# Patient Record
Sex: MALE | Race: WHITE
[De-identification: names, ages, dates, MRNs, and addresses within clinical notes are randomized per-mention and may not be internally consistent; named-entity substitution may affect disease eponyms.]

---

## 2020-10-16 ENCOUNTER — HOSPITAL ENCOUNTER (EMERGENCY)
Dept: HOSPITAL 96 - M.ERS | Age: 70
Discharge: HOME | End: 2020-10-16
Payer: MEDICARE

## 2020-10-16 VITALS — WEIGHT: 167.99 LBS | BODY MASS INDEX: 26.37 KG/M2 | HEIGHT: 67 IN

## 2020-10-16 VITALS — DIASTOLIC BLOOD PRESSURE: 78 MMHG | SYSTOLIC BLOOD PRESSURE: 122 MMHG

## 2020-10-16 DIAGNOSIS — Z79.4: ICD-10-CM
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
## 2020-10-16 DIAGNOSIS — M25.561: Primary | ICD-10-CM

## 2020-10-16 DIAGNOSIS — Z79.899: ICD-10-CM

## 2020-10-16 DIAGNOSIS — E10.9: ICD-10-CM
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
## 2020-11-03 ENCOUNTER — HOSPITAL ENCOUNTER (OUTPATIENT)
Dept: HOSPITAL 96 - M.MRI | Age: 70
End: 2020-11-03
Attending: ORTHOPAEDIC SURGERY
Payer: MEDICARE

## 2020-11-03 DIAGNOSIS — M22.2X1: ICD-10-CM

## 2020-11-03 DIAGNOSIS — M25.461: ICD-10-CM

## 2020-11-03 DIAGNOSIS — S83.221A: Primary | ICD-10-CM

## 2020-11-10 ENCOUNTER — HOSPITAL ENCOUNTER (OUTPATIENT)
Dept: HOSPITAL 96 - M.LAB | Age: 70
End: 2020-11-10
Attending: ORTHOPAEDIC SURGERY
Payer: MEDICARE

## 2020-11-10 DIAGNOSIS — Z20.828: ICD-10-CM

## 2020-11-10 DIAGNOSIS — Z01.812: Primary | ICD-10-CM

## 2020-11-13 ENCOUNTER — HOSPITAL ENCOUNTER (OUTPATIENT)
Dept: HOSPITAL 96 - M.SUR | Age: 70
Discharge: HOME | End: 2020-11-13
Attending: ORTHOPAEDIC SURGERY
Payer: MEDICARE

## 2020-11-13 DIAGNOSIS — M23.221: ICD-10-CM

## 2020-11-13 DIAGNOSIS — Z79.4: ICD-10-CM
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
## 2020-11-13 DIAGNOSIS — M25.561: Primary | ICD-10-CM

## 2020-11-13 DIAGNOSIS — Z98.890: ICD-10-CM

## 2020-11-13 DIAGNOSIS — M84.361A: ICD-10-CM

## 2020-11-13 DIAGNOSIS — M84.351A: ICD-10-CM

## 2020-11-13 DIAGNOSIS — E10.9: ICD-10-CM
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
## 2020-11-13 DIAGNOSIS — Z79.899: ICD-10-CM

## 2020-11-13 LAB
ABSOLUTE BASOPHILS: 0.1 THOU/UL (ref 0–0.2)
ABSOLUTE EOSINOPHILS: 0.2 THOU/UL (ref 0–0.7)
ABSOLUTE MONOCYTES: 0.7 THOU/UL (ref 0–1.2)
ANION GAP SERPL CALC-SCNC: 8 MMOL/L (ref 7–16)
BASOPHILS NFR BLD AUTO: 0.8 %
BUN SERPL-MCNC: 29 MG/DL (ref 7–18)
CALCIUM SERPL-MCNC: 9 MG/DL (ref 8.5–10.1)
CHLORIDE SERPL-SCNC: 107 MMOL/L (ref 98–107)
CO2 SERPL-SCNC: 27 MMOL/L (ref 21–32)
CREAT SERPL-MCNC: 1.1 MG/DL (ref 0.6–1.3)
EOSINOPHIL NFR BLD: 3 %
ESR (SEDRATE): 2 MM/HR (ref 0–20)
GLUCOSE SERPL-MCNC: 77 MG/DL (ref 70–99)
GRANULOCYTES NFR BLD MANUAL: 52.7 %
HCT VFR BLD CALC: 46.2 % (ref 42–52)
HGB BLD-MCNC: 15.9 GM/DL (ref 14–18)
LYMPHOCYTES # BLD: 2.5 THOU/UL (ref 0.8–5.3)
LYMPHOCYTES NFR BLD AUTO: 34 %
MCH RBC QN AUTO: 29.8 PG (ref 26–34)
MCHC RBC AUTO-ENTMCNC: 34.3 G/DL (ref 28–37)
MCV RBC: 86.8 FL (ref 80–100)
MONOCYTES NFR BLD: 9.5 %
MPV: 8.2 FL. (ref 7.2–11.1)
NEUTROPHILS # BLD: 3.9 THOU/UL (ref 1.6–8.1)
NUCLEATED RBCS: 0 /100WBC
PLATELET COUNT*: 159 THOU/UL (ref 150–400)
POTASSIUM SERPL-SCNC: 3.3 MMOL/L (ref 3.5–5.1)
RBC # BLD AUTO: 5.32 MIL/UL (ref 4.5–6)
RDW-CV: 13.1 % (ref 10.5–14.5)
SODIUM SERPL-SCNC: 142 MMOL/L (ref 136–145)
WBC # BLD AUTO: 7.4 THOU/UL (ref 4–11)

## 2020-11-16 NOTE — EKG
[place] [address]
Phone:  [phone]                     ELECTROCARDIOGRAM REPORT      
_______________________________________________________________________________
 
Name:         FRANK CHAVEZ                   Room:                     [facility].#:    [id number]     Account #:     [id number]  
Admission:    20    Attend Phys:   Jas Romano DO  
Discharge:                Date of Birth: [birth year]  
Date of Service: 20  Report #:      [id number]
        [id number]
_______________________________________________________________________________
THIS REPORT FOR:  //name//                      
 
                          [facility]
                                       
Test Date:    2020               Test Time:    09:47:50
Pat Name:     FRANK CHAVEZ              Department:   
Patient ID:   [id number]            Room:          
Gender:                               Technician:   ALC
:          1950               Requested By: Jas Romano
Order Number: [id number]    Sherry MD:   Arthur Stanton
                                 Measurements
Intervals                              Axis          
Rate:         82                       P:            78
WV:           134                      QRS:          61
QRSD:         88                       T:            42
QT:           378                                    
QTc:          442                                    
                           Interpretive Statements
Sinus rhythm
nonspecific st changes
No previous ECG available for comparison
Electronically Signed On 2020 12:11:25 CST by Arthur Stanton
[email or web address]
 
 
 
 
 
 
 
 
 
 
 
 
 
 
 
 
 
 
 
 
 
  <ELECTRONICALLY SIGNED>
                                           By: Arthur Stanton MD, [facility]      
  20     1211
D: 20 0947   _____________________________________
T: 20 09   Arthur Stanton MD, FACC        /EPI

## 2020-11-18 NOTE — OP
[address]  [address]                    OPERATIVE REPORT              
_______________________________________________________________________________
 
Name:       FRANK CHAVEZ                    Room:                      [facility].#:  [id number]      Account #:      [id number]  
Admission:  11/13/20     Attend Phys:    Jas Romano DO     
Discharge:               Date of Birth:  [birth year]  
         Report #: [id number]
                                                                     [id number]  
_______________________________________________________________________________
THIS REPORT FOR:  //name//                      
 
cc:  Michael Reyes John E. DO                                                  ~
CC: Jas Juan
 
DATE OF SERVICE:  11/13/2020
 
 
PREOPERATIVE DIAGNOSES:  Medial meniscus tear of the right knee and subchondral
insufficiency fracture of the medial femoral metaphyseal and medial tibial
metaphyseal region.
 
POSTOPERATIVE DIAGNOSIS:  Medial meniscus tear of the right knee and subchondral
insufficiency fracture of the medial femoral metaphyseal and medial tibial
metaphyseal region.
 
SURGERY PERFORMED:
1.  Right knee arthroscopic posterior horn medial meniscectomy, partial.
2.  Subchondroplasty to the medial femur metaphyseal region and the medial tibia
metaphyseal region.
 
SURGEON:  Jas Romano DO
 
FIRST ASSISTANT:  Dr. Chang.
 
ANESTHESIA:  General anesthetic and local anesthetic.
 
ANTIBIOTICS:  Did receive Ancef 2 grams IV piggyback preoperatively.
 
SPECIMENS:  None.
 
COMPLICATIONS:  None.
 
DRAINS:  None.
 
ESTIMATED BLOOD LOSS:  5 mL.
 
He does have photographs intraoperatively.
 
GROSS FINDINGS:  Correlate with the posterior horn of the medial meniscus
complex tear that was easily resected.  His ACL and lateral meniscus intact. 
Very minimal arthritic changes were noted throughout the knee.  The patient's
MRI definitely correlated with a subchondral fractures otherwise and the
 
 
 
[address]  [address]                    OPERATIVE REPORT              
_______________________________________________________________________________
 
Name:       FRANK CHAVEZ                    Room:                      [facility].R.#:  [id number]      Account #:      [id number]  
Admission:  11/13/20     Attend Phys:    Jas Romano DO     
Discharge:               Date of Birth:  [birth year]  
         Report #: [id number]
                                                                     [id number]  
_______________________________________________________________________________
intraoperative photographs demonstrate excellent subchondroplasty performed on
both the femur and the tibia.
 
SURGERY IN DETAIL:  The patient was taken to the operating room and placed on
table, given the benefit of general anesthetic.  She had a well-padded
tourniquet placed high on his right leg.  Leg was placed in knee gold in
standard fashion.  Gentleman underwent a chlorhexidine prep and sterile draping
for right leg surgery.  Surgery began at this point in time with making a
standard arthroscopic porthole incision after the timeout was called and
verified.  Once it was verified, I did make the incision laterally, put the
scope in the inferolateral portal, examined the hole and the probed structures. 
Found the medial meniscus tear easily and resected it through a medial porthole
with a basket and a shaver blade.  Once this was accomplished, I looked around
the knee for any other unusual findings, did not find any.  So, I removed the
scope after removed as much water from the knee as could be, and prepared for
subchondroplasty.  Using the Dary grafting material at this point in time, I
used the trocar to enter the metaphyseal region using C-arm with guidance and
then I injected 4 mL of graft into that region, again verifying the placement
with the C-arm throughout surgery.  I now moved up to the femoral side to the
medial femoral condyle, appropriately put the trocar through that area and
injected 4 mL of graft into that same region.  Surgery continued then with
placing the arthroscope back in the joint proper.  Examination was begun for any
areas where there might be any residual calcium phosphate in the knee and there
was none found.  I copiously irrigated the knee one last time, closed this
incision with 4-0 nylon in simple fashion.  Xeroform, 4 x 4s, Kerlix, soft roll,
Ace wrap dressing was applied.  Transferred off the table, taken to recovery in
stable condition.
 
I attest I was present for all critical aspects of surgery.  Needle, instrument,
sponge counts were correct.
 
 
 
 
 
 
 
 
 
 
 
 
 
 
<ELECTRONICALLY SIGNED>
                                        By:  Jas Romano DO              
11/18/20 1942
D: 11/13/20 1119_______________________________________
T: 11/13/20 1130Jas Romano DO                 /nt